# Patient Record
Sex: MALE | Race: ASIAN | NOT HISPANIC OR LATINO | Employment: FULL TIME | URBAN - METROPOLITAN AREA
[De-identification: names, ages, dates, MRNs, and addresses within clinical notes are randomized per-mention and may not be internally consistent; named-entity substitution may affect disease eponyms.]

---

## 2020-02-05 ENCOUNTER — OFFICE VISIT (OUTPATIENT)
Dept: FAMILY MEDICINE | Facility: CLINIC | Age: 35
End: 2020-02-05
Payer: COMMERCIAL

## 2020-02-05 VITALS
RESPIRATION RATE: 16 BRPM | HEIGHT: 63 IN | BODY MASS INDEX: 31.54 KG/M2 | WEIGHT: 178 LBS | DIASTOLIC BLOOD PRESSURE: 90 MMHG | HEART RATE: 84 BPM | OXYGEN SATURATION: 98 % | SYSTOLIC BLOOD PRESSURE: 143 MMHG | TEMPERATURE: 97.7 F

## 2020-02-05 DIAGNOSIS — F17.200 TOBACCO USE DISORDER: ICD-10-CM

## 2020-02-05 DIAGNOSIS — R03.0 ELEVATED BLOOD PRESSURE READING IN OFFICE WITHOUT DIAGNOSIS OF HYPERTENSION: ICD-10-CM

## 2020-02-05 DIAGNOSIS — E66.09 CLASS 1 OBESITY DUE TO EXCESS CALORIES WITHOUT SERIOUS COMORBIDITY WITH BODY MASS INDEX (BMI) OF 31.0 TO 31.9 IN ADULT: ICD-10-CM

## 2020-02-05 DIAGNOSIS — Z00.00 ROUTINE GENERAL MEDICAL EXAMINATION AT A HEALTH CARE FACILITY: Primary | ICD-10-CM

## 2020-02-05 DIAGNOSIS — E66.811 CLASS 1 OBESITY DUE TO EXCESS CALORIES WITHOUT SERIOUS COMORBIDITY WITH BODY MASS INDEX (BMI) OF 31.0 TO 31.9 IN ADULT: ICD-10-CM

## 2020-02-05 LAB
BUN SERPL-MCNC: 12 MG/DL (ref 5–24)
CALCIUM SERPL-MCNC: 9.7 MG/DL (ref 8.5–10.4)
CHLORIDE SERPLBLD-SCNC: 101 MMOL/L (ref 94–109)
CO2 SERPL-SCNC: 27 MMOL/L (ref 20–32)
CREAT SERPL-MCNC: 1 MG/DL (ref 0.8–1.5)
EGFR CALCULATED (BLACK REFERENCE): >90 ML/MIN
EGFR CALCULATED (NON BLACK REFERENCE): >90 ML/MIN
GLUCOSE SERPL-MCNC: 114 MG/DL (ref 60–109)
POTASSIUM SERPL-SCNC: 3.1 MMOL/L (ref 3.4–5.3)
SODIUM SERPL-SCNC: 142 MMOL/L (ref 133–144)

## 2020-02-05 RX ORDER — NICOTINE 21 MG/24HR
1 PATCH, TRANSDERMAL 24 HOURS TRANSDERMAL EVERY 24 HOURS
Qty: 30 PATCH | Refills: 1 | Status: SHIPPED | OUTPATIENT
Start: 2020-02-05

## 2020-02-05 ASSESSMENT — MIFFLIN-ST. JEOR: SCORE: 1637.53

## 2020-02-05 NOTE — PATIENT INSTRUCTIONS
I recommend you get in at least 30 minutes of activity 3-5 days per week. Start with a 10 minute walk 3 days a week and work your way up 30 minutes 5 days a week.       For smoking  -Use a patch first thing in the morning  -Do not smoke while using this  -Use a new patch every day    Follow up in 2 weeks to recheck your BP  Check you BP at the drug store a couple times before you return to clinic

## 2020-02-05 NOTE — PROGRESS NOTES
Assessment and Plan       Sarah Anthony is a 34 year old male with past medical hx including prior binge-drinking use, smoking, and obesity who presented to clinic today as new patient for general exam and concern for diabetes.     Routine general medical examination at a health care facility  Complete history reviewed. No concerns on exam. Reviewed findings of BMP, not consistent with diabetes. Technically slightly elevated glucose, though may have had increased gluconeogenesis given length of fast, but not in range of diabetes, unlikley management to change with A1c.   - Basic Metabolic Panel ([facility]) - Results < 1 hr    Class 1 obesity due to excess calories without serious comorbidity with body mass index (BMI) of 31.0 to 31.9 in adult  Reviewed this likely contributing to mild insulin resistance. Reviewed dietary recommendations and recommended 150 minutes of exercise weekly.     Tobacco use disorder  Reviewed contribution of this to overall health. States he began smoking as a child. Interested in quitting. Has used patches in past with good effect and wished to try these again. Recommended gum or lozenges for cravings he may have over this but he declined.  - Follow-up in 2 weeks to review smoking cessation   - nicotine (NICODERM CQ) 21 MG/24HR 24 hr patch  Dispense: 30 patch; Refill: 1    Elevated blood pressure reading in office without diagnosis of hypertension  Taken after visit, some elevation possibly due to activity from visit. Also at risk for HTN with family hx, smoking, and obesity. Recommended to measure blood pressure outside clinic and follow-up in 2 weeks for repeat.      Options for treatment and follow-up care were reviewed with the patient. Sarah Anthony engaged in the decision making process and verbalized understanding of the options discussed and agreed with the final plan.    Benjamin Rosenstein, MD, MA  [facility]  P: [phone]      Precepted today with: Kathryn Natarajan  "MD             HPI:       Chief Complaint   Patient presents with     Establish Care     Diabetes     Medication Reconciliation       Sarah Anthony is a 34 year old  male who presents for the following    [facility] care  -Re-establishing care  -Just wanting to have physical exam, specific concerns as below    Diabetes concern  -States was not feeling well recently, and while wife was giving him a massage he checked his blood sugar  -Says checked his blood sugar with mother's meter. Value was 179, this was about two weeks ago. Says was after eating (not sure how long after)  -Also checked this past Saturday () and was 93. Did not check for any specific reason except to see where it was  -Denies polydipsia, no polyuria  -No weight loss, thinks may have gained weight  -No paresthesias, no visual changes  -Currently fasting (last ate yesterday evening)  -Noted, A1c from  of 5.5  -Wants to check because he does not want to take medicines    PMHx:  -Denies medical hx  -Denies ever hospitalized    SurgHx  -Negative    FamHx  -Mother: Diabetes, HTN  -Denies other issues    SocHx  -Work: [employer] , works for company  -Living: Lives with mother and 7 children.  from mother  -Smokin/2 PPD x 26 years  -Alcohol: \"On special occasions\" about 1x/month. Typically 1-2 cans of beer. Acknowledges previously had issues with binge drinking, would drink cases at a time, but stopped doing this 2 years ago.   -Sleep: Good  -Sexually active: Yes, just women. Currently, single partner, spouse. Denies history of STIs, no concerns    A [employer]  was used for this visit         Review of Systems:        ROS: 10 point ROS neg other than the symptoms noted above in the HPI.             PFSH:   Problem List   Patient Active Problem List   Diagnosis     Health Care Home     Aseptic necrosis of other bone site     Other chronic allergic conjunctivitis     Headache, tension-type        Medications   Current Outpatient " "Medications   Medication Sig Dispense Refill     nicotine (NICODERM CQ) 14 MG/24HR patch 2h hr Place 1 patch onto the skin every 24 hours 30 patch 4        Social History    See HPI    History   Smoking Status     Current Some Day Smoker     Types: Cigarettes   Smokeless Tobacco     Not on file           No Known Allergies    Physical Exam        Physical Exam:     Vitals:    02/05/20 1427   BP: (!) 143/90   Pulse: 84   Resp: 16   Temp: 97.7  F (36.5  C)   TempSrc: Oral   SpO2: 98%   Weight: 80.7 kg (178 lb)   Height: 1.6 m (5' 3\")     Body mass index is 31.53 kg/m .    General: Awake, seated comfortably, appears well and NAD   HEENT:  - Head: Atraumatic, normocephalic  - Eyes: Corneas clear, sclera without injection, no discharge, EOMI, PERRLA  - Ears: Canals patent, no cerumen, TMs normal appearance without fluid or bulging  - Nose: Nares patent, no rhinorrhea, no congestion  - Throat: Oropharynx clear without lesions, tonsils normal, posterior pharynx without erythema, swelling, or exudate   Lymph: No anterior/posterior cervical or occipital lymphadenopathy   CV: RRR, normal S1/S2, no murmurs/rubs/gallops, Radial and DP pulses 2/4   Pulm: Normal WOB, lungs CTAB, no wheezes or crackles, good air movement   GI: Abdomen obese, soft, not tender, not distended, no organomegaly, BS normal and active throughout   MSK: No gross deformities, full AROM throughout   Skin: Clear complexion, no rashes. Hyperpigmented areas of face, chronic  Neuro: Alert, answering questions appropriately, normal thought processes; CN II-XII grossly intact, Normal Gait     Results for orders placed or performed in visit on 02/05/20   Basic Metabolic Panel ([facility]) - Results < 1 hr     Status: Abnormal   Result Value Ref Range    Glucose 114.0 (H) 60.0 - 109.0 mg/dL    Urea Nitrogen 12.0 5.0 - 24.0 mg/dL    Creatinine 1.0 0.8 - 1.5 mg/dL    Sodium 142.0 133.0 - 144.0 mmol/L    Potassium 3.1 (L) 3.4 - 5.3 mmol/L    Chloride 101.0 94.0 - 109.0 " mmol/L    Carbon Dioxide 27.0 20.0 - 32.0 mmol/L    Calcium 9.7 8.5 - 10.4 mg/dL    eGFR Calculated (Non Black Reference) >90 >60.0 mL/min    eGFR Calculated (Black Reference) >90 >60.0 mL/min     There are no discontinued medications.    Patient Instructions   Patient Instructions   I recommend you get in at least 30 minutes of activity 3-5 days per week. Start with a 10 minute walk 3 days a week and work your way up 30 minutes 5 days a week.       For smoking  -Use a patch first thing in the morning  -Do not smoke while using this  -Use a new patch every day    Follow up in 2 weeks to recheck your BP  Check you BP at the drug store a couple times before you return to clinic            This note was completed with the assistance of dictation software. Typos and word substitution-errors are expected and unintended.

## 2020-02-05 NOTE — NURSING NOTE
Due to patient being non-English speaking/uses sign language, an  was used for this visit. Only for face-to-face interpretation by an external agency, date and length of interpretation can be found on the scanned worksheet.     name: Celso Cowan  Agency: [employer]  Language: Rhett   Telephone number: [phone]  Type of interpretation: Face-to-face, spoken

## 2020-02-07 NOTE — PROGRESS NOTES
Preceptor Attestation:  Patient's case reviewed and discussed with Benjamin Rosenstein, MD resident and I evaluated the patient. I agree with written assessment and plan of care.  Supervising Physician:  ABA ARRIOLA MD  [facility]

## 2021-12-23 ENCOUNTER — IMMUNIZATION (OUTPATIENT)
Dept: FAMILY MEDICINE | Facility: CLINIC | Age: 36
End: 2021-12-23
Payer: COMMERCIAL

## 2021-12-23 PROCEDURE — 0011A PR COVID VAC MODERNA 100 MCG/0.5 ML IM: CPT

## 2021-12-23 PROCEDURE — 99207 PR NO CHARGE LOS: CPT

## 2021-12-23 PROCEDURE — 91301 PR COVID VAC MODERNA 100 MCG/0.5 ML IM: CPT
